# Patient Record
Sex: FEMALE | Race: BLACK OR AFRICAN AMERICAN | NOT HISPANIC OR LATINO
[De-identification: names, ages, dates, MRNs, and addresses within clinical notes are randomized per-mention and may not be internally consistent; named-entity substitution may affect disease eponyms.]

---

## 2021-01-05 ENCOUNTER — APPOINTMENT (OUTPATIENT)
Dept: NEUROLOGY | Facility: CLINIC | Age: 56
End: 2021-01-05
Payer: MEDICARE

## 2021-01-05 VITALS
DIASTOLIC BLOOD PRESSURE: 80 MMHG | BODY MASS INDEX: 29.84 KG/M2 | HEIGHT: 59 IN | WEIGHT: 148 LBS | SYSTOLIC BLOOD PRESSURE: 130 MMHG | TEMPERATURE: 97.3 F

## 2021-01-05 DIAGNOSIS — Z86.39 PERSONAL HISTORY OF OTHER ENDOCRINE, NUTRITIONAL AND METABOLIC DISEASE: ICD-10-CM

## 2021-01-05 DIAGNOSIS — Z86.79 PERSONAL HISTORY OF OTHER DISEASES OF THE CIRCULATORY SYSTEM: ICD-10-CM

## 2021-01-05 PROCEDURE — 99072 ADDL SUPL MATRL&STAF TM PHE: CPT

## 2021-01-05 PROCEDURE — 99204 OFFICE O/P NEW MOD 45 MIN: CPT

## 2021-01-05 RX ORDER — GLIMEPIRIDE 2 MG/1
2 TABLET ORAL
Refills: 0 | Status: ACTIVE | COMMUNITY

## 2021-01-05 RX ORDER — AMLODIPINE BESYLATE 2.5 MG/1
2.5 TABLET ORAL
Refills: 0 | Status: ACTIVE | COMMUNITY

## 2021-01-05 RX ORDER — LORATADINE 5 MG/5 ML
10 SOLUTION, ORAL ORAL
Refills: 0 | Status: ACTIVE | COMMUNITY

## 2021-01-05 RX ORDER — SITAGLIPTIN 100 MG/1
TABLET, FILM COATED ORAL
Refills: 0 | Status: ACTIVE | COMMUNITY

## 2021-01-05 RX ORDER — SIMVASTATIN 80 MG/1
TABLET, FILM COATED ORAL
Refills: 0 | Status: ACTIVE | COMMUNITY

## 2021-01-05 RX ORDER — FLUTICASONE PROPIONATE 50 UG/1
50 SPRAY, METERED NASAL
Refills: 0 | Status: ACTIVE | COMMUNITY

## 2021-01-05 RX ORDER — LOSARTAN POTASSIUM 25 MG/1
25 TABLET, FILM COATED ORAL
Refills: 0 | Status: ACTIVE | COMMUNITY

## 2021-01-05 RX ORDER — METFORMIN HYDROCHLORIDE 1000 MG/1
1000 TABLET, COATED ORAL
Refills: 0 | Status: ACTIVE | COMMUNITY

## 2021-02-18 ENCOUNTER — APPOINTMENT (OUTPATIENT)
Dept: PHYSICAL MEDICINE AND REHAB | Facility: CLINIC | Age: 56
End: 2021-02-18

## 2021-02-25 LAB
BASOPHILS # BLD AUTO: 0.04 K/UL
BASOPHILS NFR BLD AUTO: 0.6 %
CRP SERPL-MCNC: 0.53 MG/DL
EOSINOPHIL # BLD AUTO: 0.06 K/UL
EOSINOPHIL NFR BLD AUTO: 0.8 %
ERYTHROCYTE [SEDIMENTATION RATE] IN BLOOD BY WESTERGREN METHOD: 8 MM/HR
HCT VFR BLD CALC: 37.5 %
HGB BLD-MCNC: 11.5 G/DL
IMM GRANULOCYTES NFR BLD AUTO: 1 %
LYMPHOCYTES # BLD AUTO: 2.26 K/UL
LYMPHOCYTES NFR BLD AUTO: 31.1 %
MAN DIFF?: NORMAL
MCHC RBC-ENTMCNC: 28 PG
MCHC RBC-ENTMCNC: 30.7 GM/DL
MCV RBC AUTO: 91.2 FL
MONOCYTES # BLD AUTO: 1.09 K/UL
MONOCYTES NFR BLD AUTO: 15 %
NEUTROPHILS # BLD AUTO: 3.74 K/UL
NEUTROPHILS NFR BLD AUTO: 51.5 %
PLATELET # BLD AUTO: 302 K/UL
RBC # BLD: 4.11 M/UL
RBC # FLD: 12 %
WBC # FLD AUTO: 7.26 K/UL

## 2021-03-04 ENCOUNTER — APPOINTMENT (OUTPATIENT)
Dept: NEUROLOGY | Facility: CLINIC | Age: 56
End: 2021-03-04
Payer: MEDICARE

## 2021-03-04 VITALS
TEMPERATURE: 97.9 F | BODY MASS INDEX: 29.23 KG/M2 | DIASTOLIC BLOOD PRESSURE: 80 MMHG | SYSTOLIC BLOOD PRESSURE: 130 MMHG | HEIGHT: 59 IN | WEIGHT: 145 LBS

## 2021-03-04 DIAGNOSIS — G44.89 OTHER HEADACHE SYNDROME: ICD-10-CM

## 2021-03-04 PROCEDURE — 99214 OFFICE O/P EST MOD 30 MIN: CPT

## 2021-03-04 PROCEDURE — 99072 ADDL SUPL MATRL&STAF TM PHE: CPT

## 2021-03-05 ENCOUNTER — APPOINTMENT (OUTPATIENT)
Dept: PHYSICAL MEDICINE AND REHAB | Facility: CLINIC | Age: 56
End: 2021-03-05

## 2021-03-17 ENCOUNTER — APPOINTMENT (OUTPATIENT)
Dept: PHYSICAL MEDICINE AND REHAB | Facility: CLINIC | Age: 56
End: 2021-03-17
Payer: MEDICARE

## 2021-03-17 VITALS — TEMPERATURE: 97 F | WEIGHT: 150 LBS | HEIGHT: 59 IN | BODY MASS INDEX: 30.24 KG/M2

## 2021-03-17 DIAGNOSIS — M54.2 CERVICALGIA: ICD-10-CM

## 2021-03-17 DIAGNOSIS — Z78.9 OTHER SPECIFIED HEALTH STATUS: ICD-10-CM

## 2021-03-17 PROCEDURE — 99072 ADDL SUPL MATRL&STAF TM PHE: CPT

## 2021-03-17 PROCEDURE — 99202 OFFICE O/P NEW SF 15 MIN: CPT

## 2021-03-17 NOTE — ASSESSMENT
[FreeTextEntry1] : 56 y.o. F w/ minimal cervical spondylosis and periscapular MPS.  Cautioned pt. regarding PO NSAIDs 2' GI/cardiac/\par renal toxicities.  May try OTC Voltaren gel as directed.  Rx P.T. for modalities, gentle ROM, STM, stretching and stabilization exercises.  RTC 6-8 weeks.

## 2021-03-17 NOTE — DATA REVIEWED
[Plain X-Rays] : plain X-Rays [FreeTextEntry1] : X-rays C-spine (Dec 2020): straightened lordosis; C5-6 spondylosis w/ facet OA; slight anterolisthesis C7 on T1.

## 2021-03-17 NOTE — HISTORY OF PRESENT ILLNESS
[FreeTextEntry1] : 56 y.o. F w/ h/o DM, HTN & HLD presents to office w/ c/o neck pain for last 6 months.  Pt. denies antecedent trauma or injury to C-spine.  Pt. denies radiating arm pain.  Denies N/T in hands, but endorses some paresthesias in feet.  X-rays C-spine done.  No MRI.  No NSAIDs.  No P.T., chiro, acupuncture.  No injections.

## 2021-03-17 NOTE — PHYSICAL EXAM
[FreeTextEntry1] : NAD\par A&Ox3\par Non-obese\par C-spine ROM: 1 FB chin to sternum; 30' ext; 60' LR either side w/o pain\par Aguillon's: neg\par Lhermitte's: neg\par Spurling's: neg\par DTR's: 2+ B/T/Br\par MMT: 5/5 b/l UE \par Sensation: SILT.  No decrement to PP C5-T1 dermatomes\par Palpation: diffuse mid portion superior trap muscle hypertonicity/spasm/TTP\par Shoulders: full PROM w/o pain\par MMT 5-/5 B/L SS/IS\par

## 2021-05-05 ENCOUNTER — APPOINTMENT (OUTPATIENT)
Dept: PHYSICAL MEDICINE AND REHAB | Facility: CLINIC | Age: 56
End: 2021-05-05

## 2023-05-24 ENCOUNTER — NON-APPOINTMENT (OUTPATIENT)
Age: 58
End: 2023-05-24

## 2023-06-03 ENCOUNTER — NON-APPOINTMENT (OUTPATIENT)
Age: 58
End: 2023-06-03

## 2023-07-16 ENCOUNTER — NON-APPOINTMENT (OUTPATIENT)
Age: 58
End: 2023-07-16

## 2023-10-05 ENCOUNTER — NON-APPOINTMENT (OUTPATIENT)
Age: 58
End: 2023-10-05

## 2023-11-14 ENCOUNTER — NON-APPOINTMENT (OUTPATIENT)
Age: 58
End: 2023-11-14

## 2023-12-19 ENCOUNTER — NON-APPOINTMENT (OUTPATIENT)
Age: 58
End: 2023-12-19

## 2023-12-22 ENCOUNTER — NON-APPOINTMENT (OUTPATIENT)
Age: 58
End: 2023-12-22

## 2024-04-17 ENCOUNTER — OFFICE (OUTPATIENT)
Dept: URBAN - METROPOLITAN AREA CLINIC 104 | Facility: CLINIC | Age: 59
Setting detail: OPHTHALMOLOGY
End: 2024-04-17
Payer: MEDICAID

## 2024-04-17 DIAGNOSIS — H25.13: ICD-10-CM

## 2024-04-17 DIAGNOSIS — H40.013: ICD-10-CM

## 2024-04-17 DIAGNOSIS — H01.004: ICD-10-CM

## 2024-04-17 DIAGNOSIS — H11.153: ICD-10-CM

## 2024-04-17 DIAGNOSIS — H01.001: ICD-10-CM

## 2024-04-17 DIAGNOSIS — H01.002: ICD-10-CM

## 2024-04-17 DIAGNOSIS — H01.005: ICD-10-CM

## 2024-04-17 DIAGNOSIS — E11.9: ICD-10-CM

## 2024-04-17 PROCEDURE — 92133 CPTRZD OPH DX IMG PST SGM ON: CPT | Performed by: SPECIALIST

## 2024-04-17 PROCEDURE — 92014 COMPRE OPH EXAM EST PT 1/>: CPT | Performed by: SPECIALIST

## 2024-04-17 ASSESSMENT — LID EXAM ASSESSMENTS
OS_BLEPHARITIS: LLL LUL
OD_BLEPHARITIS: RLL RUL

## 2024-05-10 ENCOUNTER — NON-APPOINTMENT (OUTPATIENT)
Age: 59
End: 2024-05-10

## 2024-05-23 ENCOUNTER — APPOINTMENT (OUTPATIENT)
Dept: ORTHOPEDIC SURGERY | Facility: CLINIC | Age: 59
End: 2024-05-23
Payer: MEDICARE

## 2024-05-23 DIAGNOSIS — M70.61 TROCHANTERIC BURSITIS, RIGHT HIP: ICD-10-CM

## 2024-05-23 DIAGNOSIS — M25.551 PAIN IN RIGHT HIP: ICD-10-CM

## 2024-05-23 DIAGNOSIS — M51.36 OTHER INTERVERTEBRAL DISC DEGENERATION, LUMBAR REGION: ICD-10-CM

## 2024-05-23 PROCEDURE — 72100 X-RAY EXAM L-S SPINE 2/3 VWS: CPT

## 2024-05-23 PROCEDURE — 99203 OFFICE O/P NEW LOW 30 MIN: CPT | Mod: 25

## 2024-05-23 PROCEDURE — 72170 X-RAY EXAM OF PELVIS: CPT

## 2024-05-23 NOTE — PHYSICAL EXAM
[Right] : right hip [NL (120)] : flexion 120 degrees [NL (30)] : extension 30 degrees [NL (35)] : adduction 35 degrees [NL (45)] : internal rotation 45 degrees [5___] : adduction 5[unfilled]/5 [] : mildly antalgic

## 2024-05-23 NOTE — HISTORY OF PRESENT ILLNESS
[Dull/Aching] : dull/aching [Sharp] : sharp [de-identified] :  05/23/2024: Him in a close this note2-month history of laterally based right hip pain.  She denies specific injury or radicular complaints down the leg.  She states symptoms are worse with start up when she first wakes up in the morning and after prolonged standing.  She saw her PCP who advised Voltaren gel which she has noted some improvement  Medical history diabetes type 2 last A1c was 7.0  She works as a cafeteria aid [] : no [FreeTextEntry1] : right hip

## 2024-07-01 ENCOUNTER — RX RENEWAL (OUTPATIENT)
Age: 59
End: 2024-07-01

## 2024-07-15 NOTE — HISTORY OF PRESENT ILLNESS
[FreeTextEntry1] : Referred by GYN- Taniya Baptiste PCP GYN- GI  Ms. VICENTE is a 59 year year old, G P  female, referred by Dr. Hernandez  , for a left ovarian cyst, and positive tumor markers. MRI (5/2024) of pelvis shows left ovarian cyst versus loculated ascites in the left adnexa. She denies abdominal/pelvic pain, dyspnea or chest pain, vaginal bleeding or discharge, nausea/vomiting, changes in bowel habits or urination, lower extremity edema or pain.  She denies any other associated signs or symptoms. She is here to discuss further management.   MRI of Pelvic (5/2024) Uterus: Post hysterectomy RTO: 2x 1.6 x 1.4 cm LTO: 2.1 x 1.3 x3.2 cm, there is a triangular shaped fluid signal structure inferior to the left ovary measuring 2.1x 2.1x 2.1cm Peritoneum/retroperitoneum: No ascites or peritoneal nodularity Lymph nodes: No enlarged lymph nodes Gastrointestinal: no evidence of bowel obstruction Bladder: WNL      Pelvic US(3/2024) Uterus: Surgical Absent  RTO: 1.7 x1.7 x1.2cm  LTO: 1.6 x 1.6 x1.0 cm with persistent fluid 1.6 x 1.2 x 1.2 cm CUL-DE-SAC: Trace of free fluid    Tumor Markers: CA-125: 18.4U/ml (5/2024) HE4: 113.0 Postmenopausal JOSEPH: 3.62   PMHx: diabetes, hyperlipidemia, hypertension PSHx: Denies surgical hx  Family hx of cancer: Social hx: Single, denies smoking, No alcohol use     HM Pap:neg (5/2024) HPV neg(5/2024) Mammo: BI-RADS 1 neg (7/2023) Colonoscopy Bone density: T score of 1.2 shows osteopenia

## 2024-07-23 ENCOUNTER — APPOINTMENT (OUTPATIENT)
Dept: GYNECOLOGIC ONCOLOGY | Facility: CLINIC | Age: 59
End: 2024-07-23

## 2024-08-01 ENCOUNTER — NON-APPOINTMENT (OUTPATIENT)
Age: 59
End: 2024-08-01

## 2024-08-01 ENCOUNTER — APPOINTMENT (OUTPATIENT)
Dept: GYNECOLOGIC ONCOLOGY | Facility: CLINIC | Age: 59
End: 2024-08-01
Payer: MEDICARE

## 2024-08-01 VITALS
BODY MASS INDEX: 34.27 KG/M2 | RESPIRATION RATE: 16 BRPM | OXYGEN SATURATION: 99 % | WEIGHT: 170 LBS | SYSTOLIC BLOOD PRESSURE: 180 MMHG | DIASTOLIC BLOOD PRESSURE: 94 MMHG | HEIGHT: 59 IN | HEART RATE: 68 BPM

## 2024-08-01 DIAGNOSIS — N94.9 UNSPECIFIED CONDITION ASSOCIATED WITH FEMALE GENITAL ORGANS AND MENSTRUAL CYCLE: ICD-10-CM

## 2024-08-01 PROCEDURE — 99204 OFFICE O/P NEW MOD 45 MIN: CPT

## 2024-08-01 PROCEDURE — 99459 PELVIC EXAMINATION: CPT

## 2024-08-01 NOTE — PHYSICAL EXAM
[Chaperone Present] : A chaperone was present in the examining room during all aspects of the physical examination [49659] : A chaperone was present during the pelvic exam. [Normal] : Recto-Vaginal Exam: Normal [Absent] : Uterus: Absent [Restricted in physically strenuous activity but ambulatory and able to carry out work of a light or sedentary nature] : Status 1- Restricted in physically strenuous activity but ambulatory and able to carry out work of a light or sedentary nature, e.g., light house work, office work [FreeTextEntry2] : Angela Parikh PA-C [de-identified] : SSE: NEFG, mildly atrophic vaginal mucosa, surgically absent cervix, intact vaginal cuff w/o lesions/nodularity BME: Intact vaginal cuff, no palpable adnexal or pelvic masses RVE: smooth rectovaginal septum, intact anal sphincter

## 2024-08-01 NOTE — ASSESSMENT
[FreeTextEntry1] : Ms. Mirza is a 60yo  LMP around age 39 s/p ABAD for cervical dysplasia who presents today for complex L adnexal mass. Reviewed imaging from March/May which demonstrate stable 2cm para-ovarian cyst vs. loculated ascites in left adnexa, CA-125 wnl. Overall, low suspicion for malignancy.  We had an extensive discussion today about management of ovarian cysts in postmenopausal women. We reviewed the differential of ovarian cysts in this setting:   Benign ovarian cysts Ovarian cysts of borderline malignant potential Malignant ovarian cysts (ovarian cancer) Benign non-ovarian processes (fallopian tube cysts, fibroids, scar tissue) Malignant non-ovarian processes (adjacent organ or metastasis from another primary site)   Malignancy cannot be ruled out without removal of the mass. Preoperative biopsy is not feasible as needle biopsy risks rupture of the mass. Despite these facts, given the findings on the ultrasound, it would be reasonable to consider observation - although we explained that surgery would also be the only way to entirely rule out malignancy. I recommend repeat TVUS and CA-125 in 2-3mos. RTO after imaging to discuss results and assess need for surgical intervention at that time.

## 2024-08-01 NOTE — CHIEF COMPLAINT
[FreeTextEntry1] : [facility] [place]. [phone] [address] [place] [address]  CC: Complex adnexal mass

## 2024-08-01 NOTE — ASSESSMENT
[FreeTextEntry1] : Ms. Mirza is a 58yo  LMP around age 39 s/p ABAD for cervical dysplasia who presents today for complex L adnexal mass. Reviewed imaging from March/May which demonstrate stable 2cm para-ovarian cyst vs. loculated ascites in left adnexa, CA-125 wnl. Overall, low suspicion for malignancy.  We had an extensive discussion today about management of ovarian cysts in postmenopausal women. We reviewed the differential of ovarian cysts in this setting:   Benign ovarian cysts Ovarian cysts of borderline malignant potential Malignant ovarian cysts (ovarian cancer) Benign non-ovarian processes (fallopian tube cysts, fibroids, scar tissue) Malignant non-ovarian processes (adjacent organ or metastasis from another primary site)   Malignancy cannot be ruled out without removal of the mass. Preoperative biopsy is not feasible as needle biopsy risks rupture of the mass. Despite these facts, given the findings on the ultrasound, it would be reasonable to consider observation - although we explained that surgery would also be the only way to entirely rule out malignancy. I recommend repeat TVUS and CA-125 in 2-3mos. RTO after imaging to discuss results and assess need for surgical intervention at that time.

## 2024-08-01 NOTE — PHYSICAL EXAM
[Chaperone Present] : A chaperone was present in the examining room during all aspects of the physical examination [[id number]] : A chaperone was present during the pelvic exam. [Normal] : Recto-Vaginal Exam: Normal [Absent] : Uterus: Absent [Restricted in physically strenuous activity but ambulatory and able to carry out work of a light or sedentary nature] : Status 1- Restricted in physically strenuous activity but ambulatory and able to carry out work of a light or sedentary nature, e.g., light house work, office work [FreeTextEntry2] : Angela Parikh PA-C [de-identified] : SSE: NEFG, mildly atrophic vaginal mucosa, surgically absent cervix, intact vaginal cuff w/o lesions/nodularity BME: Intact vaginal cuff, no palpable adnexal or pelvic masses RVE: smooth rectovaginal septum, intact anal sphincter

## 2024-08-01 NOTE — PLAN
[TextEntry] : - Reviewed original imaging demonstrating stable 2cm L para-ovarian cyst vs. loculated fluid collection; CA-125 wnl - Overall low suspicion for malignancy - Recommend surveillance at this time w/ repeat TVUS and CA-125 in ~2mos - RTO after imaging and labs to discuss results and need for continued surveillance vs. intervention at that time

## 2024-08-01 NOTE — HISTORY OF PRESENT ILLNESS
[FreeTextEntry1] : Ms. Mirza is a 58yo  LMP around age 39 s/p ABAD for cervical dysplasia who presents today as a referral from Dr. Wang for complex L adnexal mass. Patient reports pelvic pain x 1 week this past  prompting visit to [facility]. She was found to have kidney stone for which she is scheduled for lithotripsy on 24. Patient reports resolution of pain and denies any pain episodes previous to this. Pelvic US below was performed routinely by her GYN. She denies abnormal VB or changes in bowel/bladder habits. Denies early satiety or bloating.   Of note pt with learning disability and hearing impaired and presented today with her  from [place] services.   Pelvic US performed in 2024 revealed L. adnexal persistent fluid collection measuring 1.6x1.2x1.2cm's.  MRI pelvis performed 24 revealed L. para-ovarian cyst vs. loculated ascites in L. adnexa 2.1x2.1x2.1cm's. CT A/P performed at [facility] on 24 revealed no ascites, ovaries not commented on. No lymphadenopathy.  Tumor markers performed May 2024 were WNL, : 18.4, Postmenopausal JOSEPH 2.61  HCM Pap: 2024: negative for malignancy and HPV Mammo: 2023: BIRADS 1 C-scope: never DEXA: 2023, Osteopenia. Takes calcium and vit D daily   Ob Hx: 2 C/S Gyn Hx: no hx fibroids or ovarian cysts in the past Med Hx: Learning disable, hearing impaired, HLD, diabetes type 2 Surg Hx: lithotripsy, C/Sx2, ABAD for cervical dysplasia >200 years ago at [facility] Meds: ASA 81mg, simvastatin 20mg, metformin 1000mg, glimepiride 8mg All: Clindamycin, clarithromycin and PCN intolerance: pt reports development of yeast infections Fam Hx: Colon CA: Maternal Uncle- unknown age,  Soc Hx: nonsmoker, no alcohol use Genetics: never

## 2024-08-01 NOTE — HISTORY OF PRESENT ILLNESS
[FreeTextEntry1] : Ms. Mirza is a 60yo  LMP around age 39 s/p ABAD for cervical dysplasia who presents today as a referral from Dr. Wang for complex L adnexal mass. Patient reports pelvic pain x 1 week this past  prompting visit to [facility]. She was found to have kidney stone for which she is scheduled for lithotripsy on 24. Patient reports resolution of pain and denies any pain episodes previous to this. Pelvic US below was performed routinely by her GYN. She denies abnormal VB or changes in bowel/bladder habits. Denies early satiety or bloating.   Of note pt with learning disability and hearing impaired and presented today with her  from [place] services.   Pelvic US performed in 2024 revealed L. adnexal persistent fluid collection measuring 1.6x1.2x1.2cm's.  MRI pelvis performed 24 revealed L. para-ovarian cyst vs. loculated ascites in L. adnexa 2.1x2.1x2.1cm's. CT A/P performed at [facility] on 24 revealed no ascites, ovaries not commented on. No lymphadenopathy.  Tumor markers performed May 2024 were WNL, : 18.4, Postmenopausal JOSEPH 2.61  HCM Pap: 2024: negative for malignancy and HPV Mammo: 2023: BIRADS 1 C-scope: never DEXA: 2023, Osteopenia. Takes calcium and vit D daily   Ob Hx: 2 C/S Gyn Hx: no hx fibroids or ovarian cysts in the past Med Hx: Learning disable, hearing impaired, HLD, diabetes type 2 Surg Hx: lithotripsy, C/Sx2, ABAD for cervical dysplasia >200 years ago at [facility] Meds: ASA 81mg, simvastatin 20mg, metformin 1000mg, glimepiride 8mg All: Clindamycin, clarithromycin and PCN intolerance: pt reports development of yeast infections Fam Hx: Colon CA: Maternal Uncle- unknown age,  Soc Hx: nonsmoker, no alcohol use Genetics: never

## 2024-08-01 NOTE — CHIEF COMPLAINT
[FreeTextEntry1] : [facility] [place]. [phone] [address] [place] [address]  CC: Complex adnexal mass

## 2024-08-12 ENCOUNTER — NON-APPOINTMENT (OUTPATIENT)
Age: 59
End: 2024-08-12

## 2024-09-04 ENCOUNTER — APPOINTMENT (OUTPATIENT)
Dept: GYNECOLOGIC ONCOLOGY | Facility: CLINIC | Age: 59
End: 2024-09-04
Payer: MEDICARE

## 2024-09-04 VITALS
WEIGHT: 170 LBS | HEART RATE: 82 BPM | BODY MASS INDEX: 34.27 KG/M2 | HEIGHT: 59 IN | OXYGEN SATURATION: 96 % | DIASTOLIC BLOOD PRESSURE: 85 MMHG | SYSTOLIC BLOOD PRESSURE: 163 MMHG | RESPIRATION RATE: 16 BRPM

## 2024-09-04 DIAGNOSIS — Z87.442 PERSONAL HISTORY OF URINARY CALCULI: ICD-10-CM

## 2024-09-04 DIAGNOSIS — N94.9 UNSPECIFIED CONDITION ASSOCIATED WITH FEMALE GENITAL ORGANS AND MENSTRUAL CYCLE: ICD-10-CM

## 2024-09-04 PROCEDURE — 99213 OFFICE O/P EST LOW 20 MIN: CPT

## 2024-09-05 NOTE — ASSESSMENT
[FreeTextEntry1] : 58 yo female with left para ovarian cyst vs loculated ascites in L adnexa. 
[FreeTextEntry1] : 60 yo female with left para ovarian cyst vs loculated ascites in L adnexa. 
Quality 110: Preventive Care And Screening: Influenza Immunization: Influenza immunization was not ordered or administered, reason not given
Quality 130: Documentation Of Current Medications In The Medical Record: Current Medications Documented
Detail Level: Detailed

## 2024-09-05 NOTE — CHIEF COMPLAINT
[FreeTextEntry1] : [facility] [place] [facility] [address] [place] [address]

## 2024-09-05 NOTE — CHIEF COMPLAINT
[FreeTextEntry1] : [facility] [place] [facility] [address] [place] [address]

## 2024-09-05 NOTE — HISTORY OF PRESENT ILLNESS
[FreeTextEntry1] : 60 yo  via 2 C/S LMP age 39 s/p hysterectomy for cervical dysplasia, self-referred for second opinion on complex adnexal mass. Patient had ABAD for cervical dysplasia around age 39, she was referred to Dr Looney for complex L adnexal mass found on imaging at [facility].  TV/pelvic US performed in 3/1/2024 revealed L. adnexal persistent fluid collection measuring 1.6 x 1.2 x 1.2 cm.  MRI pelvis 24 with left para ovarian cyst versus loculated ascites in the left adnexa 2.1 x 2.1x 2.1 cm. No ascites or peritoneal nodularity, or lymphadenopathy.   CT A/P performed at [facility] on 24 with 4 mm non obstructing distal left UVJ stone, bilateral non obstructing renal stones, bilateral medullary nephocalcinosis, small hiatal hernia. Reproductive organs: postmenopausal uterus not identified and presumed surgically removed, no mention of ovaries. No lymphadenopathy.  Tumor markers performed 2024 : 18.4, Postmenopausal JOSEPH 2.61, HE4 elevated at 113.   She had seen Dr Looney and was recommended surveillance with q3mo US and CA-125, as suspicion of malignancy were low. Patient presents today for second opinion as she is concerned her adnexal cyst is a cancer. She reports pain with intercourse, specifically on entry & states her GYN prescribed a 5-day course of a cream to help with this discomfort  LDEXA: 23 osteopenia Lmammo: 23 B1 Lpap: 24 NILM HPV negative Lcolonoscopy: overdue

## 2024-09-05 NOTE — HISTORY OF PRESENT ILLNESS
[FreeTextEntry1] : 58 yo  via 2 C/S LMP age 39 s/p hysterectomy for cervical dysplasia, self-referred for second opinion on complex adnexal mass. Patient had ABAD for cervical dysplasia around age 39, she was referred to Dr Looney for complex L adnexal mass found on imaging at [facility].  TV/pelvic US performed in 3/1/2024 revealed L. adnexal persistent fluid collection measuring 1.6 x 1.2 x 1.2 cm.  MRI pelvis 24 with left para ovarian cyst versus loculated ascites in the left adnexa 2.1 x 2.1x 2.1 cm. No ascites or peritoneal nodularity, or lymphadenopathy.   CT A/P performed at [facility] on 24 with 4 mm non obstructing distal left UVJ stone, bilateral non obstructing renal stones, bilateral medullary nephocalcinosis, small hiatal hernia. Reproductive organs: postmenopausal uterus not identified and presumed surgically removed, no mention of ovaries. No lymphadenopathy.  Tumor markers performed 2024 : 18.4, Postmenopausal JOSEPH 2.61, HE4 elevated at 113.   She had seen Dr Looney and was recommended surveillance with q3mo US and CA-125, as suspicion of malignancy were low. Patient presents today for second opinion as she is concerned her adnexal cyst is a cancer. She reports pain with intercourse, specifically on entry & states her GYN prescribed a 5-day course of a cream to help with this discomfort  LDEXA: 23 osteopenia Lmammo: 23 B1 Lpap: 24 NILM HPV negative Lcolonoscopy: overdue

## 2024-09-05 NOTE — PLAN
[TextEntry] : Surveillance with CA-125 (3 months from last ~9/9/24) and US (3 months from last imaging ~9/23/24) Continue follow up with Dr Looney or myself if she chooses Can continue with kidney stone surgery as long as surgery is not transabdominal approach

## 2024-09-05 NOTE — END OF VISIT
[FreeTextEntry3] : I, Dr. Anne-Marie Pa, personally performed the evaluation and management of (E/M) services for this new patient. That E/M includes conducting the initial examination, assessing all conditions, and establishing the plan of care. Today, Ermelinda Cisneros PA-C, was here to observe my evaluation and management services for this patient to be followed going forward.

## 2024-09-05 NOTE — DISCUSSION/SUMMARY
[FreeTextEntry1] : Reviewed imaging findings with patient which does not show concerning features for malignancy. However, advised patient without surgical resection I am unable to say malignancy isn't present, but I do have a low suspicion. I instructed her the risk of surgery outweighs the concern I have that there is a malignancy present, therefore the benefit of surgery would be lower than the risk of surgery and is the reason why it is not being recommended. Patient states she is able to sleep at night and does not have a specific fear about potential malignancy but has been thinking about these findings. I think reassurance with tumor markers now and US by the end of the month followed by a follow up appointment with Dr Looney or myself will relieve her anxiety about imaging findings. In addition, her HE4 being elevated is explained by her kidney stones and history of diabetes. She is able to proceed with kidney stone surgery as patient is describing a transurethral approach and not transabdominally.

## 2024-09-23 ENCOUNTER — RX RENEWAL (OUTPATIENT)
Age: 59
End: 2024-09-23

## 2024-09-25 ENCOUNTER — NON-APPOINTMENT (OUTPATIENT)
Age: 59
End: 2024-09-25

## 2024-10-07 ENCOUNTER — APPOINTMENT (OUTPATIENT)
Dept: GYNECOLOGIC ONCOLOGY | Facility: CLINIC | Age: 59
End: 2024-10-07
Payer: MEDICARE

## 2024-10-07 VITALS
HEART RATE: 77 BPM | OXYGEN SATURATION: 100 % | RESPIRATION RATE: 16 BRPM | SYSTOLIC BLOOD PRESSURE: 159 MMHG | WEIGHT: 167 LBS | BODY MASS INDEX: 33.67 KG/M2 | HEIGHT: 59 IN | DIASTOLIC BLOOD PRESSURE: 88 MMHG

## 2024-10-07 PROCEDURE — 99213 OFFICE O/P EST LOW 20 MIN: CPT

## 2024-10-11 ENCOUNTER — NON-APPOINTMENT (OUTPATIENT)
Age: 59
End: 2024-10-11

## 2024-10-15 ENCOUNTER — NON-APPOINTMENT (OUTPATIENT)
Age: 59
End: 2024-10-15

## 2024-10-24 ENCOUNTER — OFFICE (OUTPATIENT)
Dept: URBAN - METROPOLITAN AREA CLINIC 104 | Facility: CLINIC | Age: 59
Setting detail: OPHTHALMOLOGY
End: 2024-10-24
Payer: MEDICARE

## 2024-10-24 ENCOUNTER — RX ONLY (RX ONLY)
Age: 59
End: 2024-10-24

## 2024-10-24 DIAGNOSIS — H01.001: ICD-10-CM

## 2024-10-24 DIAGNOSIS — H25.13: ICD-10-CM

## 2024-10-24 DIAGNOSIS — H40.013: ICD-10-CM

## 2024-10-24 DIAGNOSIS — H11.153: ICD-10-CM

## 2024-10-24 DIAGNOSIS — H01.004: ICD-10-CM

## 2024-10-24 DIAGNOSIS — H01.002: ICD-10-CM

## 2024-10-24 DIAGNOSIS — E11.9: ICD-10-CM

## 2024-10-24 DIAGNOSIS — H01.005: ICD-10-CM

## 2024-10-24 PROCEDURE — 92083 EXTENDED VISUAL FIELD XM: CPT | Performed by: SPECIALIST

## 2024-10-24 PROCEDURE — 99213 OFFICE O/P EST LOW 20 MIN: CPT | Performed by: SPECIALIST

## 2024-10-24 ASSESSMENT — LID EXAM ASSESSMENTS
OD_BLEPHARITIS: RLL RUL
OS_BLEPHARITIS: LLL LUL

## 2024-10-24 ASSESSMENT — REFRACTION_AUTOREFRACTION
OD_SPHERE: +2.75
OS_SPHERE: +2.75
OS_AXIS: 091
OD_AXIS: 073
OS_CYLINDER: -0.50
OD_CYLINDER: -0.50

## 2024-10-24 ASSESSMENT — CONFRONTATIONAL VISUAL FIELD TEST (CVF)
OS_FINDINGS: FULL
OD_FINDINGS: FULL

## 2024-10-24 ASSESSMENT — KERATOMETRY
OD_K2POWER_DIOPTERS: 44.00
OS_K1POWER_DIOPTERS: 43.27
OD_K1POWER_DIOPTERS: 43.10
OD_AXISANGLE_DEGREES: 090
OS_K2POWER_DIOPTERS: 43.89
OS_AXISANGLE_DEGREES: 079

## 2024-10-24 ASSESSMENT — REFRACTION_CURRENTRX
OD_ADD: +2.50
OD_SPHERE: +2.00
OD_OVR_VA: 20/
OS_OVR_VA: 20/
OS_ADD: +2.50
OS_SPHERE: +1.75

## 2024-10-24 ASSESSMENT — TONOMETRY
OD_IOP_MMHG: 16
OS_IOP_MMHG: 15

## 2024-10-24 ASSESSMENT — VISUAL ACUITY
OD_BCVA: 20/30
OS_BCVA: 20/20-2

## 2024-10-24 ASSESSMENT — PACHYMETRY
OD_CT_UM: 502
OS_CT_CORRECTION: 3
OS_CT_UM: 508
OD_CT_CORRECTION: 3

## 2024-12-03 ENCOUNTER — NON-APPOINTMENT (OUTPATIENT)
Age: 59
End: 2024-12-03

## 2024-12-05 ENCOUNTER — APPOINTMENT (OUTPATIENT)
Dept: GYNECOLOGIC ONCOLOGY | Facility: CLINIC | Age: 59
End: 2024-12-05

## 2024-12-19 ENCOUNTER — NON-APPOINTMENT (OUTPATIENT)
Age: 59
End: 2024-12-19

## 2025-01-06 ENCOUNTER — APPOINTMENT (OUTPATIENT)
Dept: GYNECOLOGIC ONCOLOGY | Facility: CLINIC | Age: 60
End: 2025-01-06
Payer: MEDICARE

## 2025-01-06 VITALS
OXYGEN SATURATION: 98 % | SYSTOLIC BLOOD PRESSURE: 147 MMHG | HEIGHT: 59 IN | HEART RATE: 88 BPM | BODY MASS INDEX: 28.83 KG/M2 | WEIGHT: 143 LBS | DIASTOLIC BLOOD PRESSURE: 90 MMHG

## 2025-01-06 PROCEDURE — 99212 OFFICE O/P EST SF 10 MIN: CPT

## 2025-02-16 ENCOUNTER — NON-APPOINTMENT (OUTPATIENT)
Age: 60
End: 2025-02-16

## 2025-05-30 ENCOUNTER — NON-APPOINTMENT (OUTPATIENT)
Age: 60
End: 2025-05-30

## 2025-06-17 ENCOUNTER — OFFICE (OUTPATIENT)
Dept: URBAN - METROPOLITAN AREA CLINIC 104 | Facility: CLINIC | Age: 60
Setting detail: OPHTHALMOLOGY
End: 2025-06-17
Payer: MEDICARE

## 2025-06-17 DIAGNOSIS — H25.13: ICD-10-CM

## 2025-06-17 DIAGNOSIS — H01.004: ICD-10-CM

## 2025-06-17 DIAGNOSIS — H11.153: ICD-10-CM

## 2025-06-17 DIAGNOSIS — H01.001: ICD-10-CM

## 2025-06-17 DIAGNOSIS — H01.005: ICD-10-CM

## 2025-06-17 DIAGNOSIS — H01.002: ICD-10-CM

## 2025-06-17 DIAGNOSIS — E11.9: ICD-10-CM

## 2025-06-17 DIAGNOSIS — H40.013: ICD-10-CM

## 2025-06-17 PROCEDURE — 92133 CPTRZD OPH DX IMG PST SGM ON: CPT | Performed by: SPECIALIST

## 2025-06-17 PROCEDURE — 92014 COMPRE OPH EXAM EST PT 1/>: CPT | Performed by: SPECIALIST

## 2025-06-17 ASSESSMENT — KERATOMETRY
OD_K1POWER_DIOPTERS: 43.10
OS_AXISANGLE_DEGREES: 079
OS_K2POWER_DIOPTERS: 43.89
OS_K1POWER_DIOPTERS: 43.27
OD_K2POWER_DIOPTERS: 44.00
OD_AXISANGLE_DEGREES: 090

## 2025-06-17 ASSESSMENT — REFRACTION_CURRENTRX
OS_CYLINDER: SPH
OD_SPHERE: +2.00
OS_OVR_VA: 20/
OD_ADD: +2.50
OD_CYLINDER: SPH
OS_SPHERE: +1.75
OS_ADD: +2.50
OD_OVR_VA: 20/

## 2025-06-17 ASSESSMENT — PACHYMETRY
OD_CT_UM: 502
OS_CT_UM: 508
OD_CT_CORRECTION: 3
OS_CT_CORRECTION: 3

## 2025-06-17 ASSESSMENT — VISUAL ACUITY
OS_BCVA: 20/25-2
OD_BCVA: 20/25-2

## 2025-06-17 ASSESSMENT — LID EXAM ASSESSMENTS
OS_BLEPHARITIS: LLL LUL
OD_BLEPHARITIS: RLL RUL

## 2025-06-17 ASSESSMENT — REFRACTION_AUTOREFRACTION
OS_AXIS: 091
OD_SPHERE: +2.75
OS_SPHERE: +2.75
OD_AXIS: 073
OD_CYLINDER: -0.50
OS_CYLINDER: -0.50

## 2025-06-17 ASSESSMENT — CONFRONTATIONAL VISUAL FIELD TEST (CVF)
OD_FINDINGS: FULL
OS_FINDINGS: FULL

## 2025-07-08 ENCOUNTER — OFFICE (OUTPATIENT)
Dept: URBAN - METROPOLITAN AREA CLINIC 104 | Facility: CLINIC | Age: 60
Setting detail: OPHTHALMOLOGY
End: 2025-07-08
Payer: MEDICARE

## 2025-07-08 DIAGNOSIS — H01.005: ICD-10-CM

## 2025-07-08 DIAGNOSIS — H40.013: ICD-10-CM

## 2025-07-08 DIAGNOSIS — E11.9: ICD-10-CM

## 2025-07-08 DIAGNOSIS — H01.002: ICD-10-CM

## 2025-07-08 DIAGNOSIS — H11.153: ICD-10-CM

## 2025-07-08 DIAGNOSIS — H01.004: ICD-10-CM

## 2025-07-08 DIAGNOSIS — H25.13: ICD-10-CM

## 2025-07-08 DIAGNOSIS — H01.001: ICD-10-CM

## 2025-07-08 PROCEDURE — 99213 OFFICE O/P EST LOW 20 MIN: CPT | Performed by: SPECIALIST

## 2025-07-08 ASSESSMENT — PACHYMETRY
OS_CT_UM: 508
OD_CT_CORRECTION: 3
OS_CT_CORRECTION: 3
OD_CT_UM: 502

## 2025-07-08 ASSESSMENT — VISUAL ACUITY
OD_BCVA: 20/25
OS_BCVA: 20/25

## 2025-07-08 ASSESSMENT — REFRACTION_CURRENTRX
OD_OVR_VA: 20/
OS_OVR_VA: 20/

## 2025-07-08 ASSESSMENT — LID EXAM ASSESSMENTS
OD_BLEPHARITIS: RLL RUL
OS_BLEPHARITIS: LLL LUL

## 2025-07-08 ASSESSMENT — CONFRONTATIONAL VISUAL FIELD TEST (CVF)
OS_FINDINGS: FULL
OD_FINDINGS: FULL

## 2025-07-08 ASSESSMENT — TONOMETRY
OD_IOP_MMHG: 20
OS_IOP_MMHG: 18

## 2025-09-13 ENCOUNTER — NON-APPOINTMENT (OUTPATIENT)
Age: 60
End: 2025-09-13

## 2025-09-19 ENCOUNTER — APPOINTMENT (OUTPATIENT)
Dept: ORTHOPEDIC SURGERY | Facility: CLINIC | Age: 60
End: 2025-09-19

## 2025-09-23 PROBLEM — M75.81 TENDINITIS OF RIGHT ROTATOR CUFF: Status: ACTIVE | Noted: 2025-09-23

## 2025-09-23 PROBLEM — M19.021 PRIMARY OSTEOARTHRITIS OF RIGHT ELBOW: Status: ACTIVE | Noted: 2025-09-23

## 2025-09-23 PROBLEM — M77.11 LATERAL EPICONDYLITIS OF RIGHT ELBOW: Status: ACTIVE | Noted: 2025-09-23

## 2025-09-23 PROBLEM — M75.51 BURSITIS OF RIGHT SHOULDER: Status: ACTIVE | Noted: 2025-09-23
